# Patient Record
Sex: FEMALE | Race: WHITE | NOT HISPANIC OR LATINO | URBAN - METROPOLITAN AREA
[De-identification: names, ages, dates, MRNs, and addresses within clinical notes are randomized per-mention and may not be internally consistent; named-entity substitution may affect disease eponyms.]

---

## 2020-06-26 ENCOUNTER — WEB ENCOUNTER (OUTPATIENT)
Dept: URBAN - METROPOLITAN AREA CLINIC 19 | Facility: CLINIC | Age: 65
End: 2020-06-26

## 2020-06-30 ENCOUNTER — OFFICE VISIT (OUTPATIENT)
Dept: URBAN - METROPOLITAN AREA CLINIC 19 | Facility: CLINIC | Age: 65
End: 2020-06-30
Payer: MEDICARE

## 2020-06-30 DIAGNOSIS — K52.832 LYMPHOCYTIC COLITIS: ICD-10-CM

## 2020-06-30 DIAGNOSIS — K75.81 NASH (NONALCOHOLIC STEATOHEPATITIS): ICD-10-CM

## 2020-06-30 PROCEDURE — G8417 CALC BMI ABV UP PARAM F/U: HCPCS | Performed by: INTERNAL MEDICINE

## 2020-06-30 PROCEDURE — G9903 PT SCRN TBCO ID AS NON USER: HCPCS | Performed by: INTERNAL MEDICINE

## 2020-06-30 PROCEDURE — 3017F COLORECTAL CA SCREEN DOC REV: CPT | Performed by: INTERNAL MEDICINE

## 2020-06-30 PROCEDURE — G8427 DOCREV CUR MEDS BY ELIG CLIN: HCPCS | Performed by: INTERNAL MEDICINE

## 2020-06-30 PROCEDURE — 99214 OFFICE O/P EST MOD 30 MIN: CPT | Performed by: INTERNAL MEDICINE

## 2020-06-30 RX ORDER — ATORVASTATIN CALCIUM 40 MG/1
TABLET, FILM COATED ORAL
Qty: 0 | Refills: 0 | Status: ACTIVE | COMMUNITY
Start: 1900-01-01

## 2020-06-30 RX ORDER — METOPROLOL SUCCINATE 100 MG/1
TAKE 1 TABLET (100 MG) BY ORAL ROUTE ONCE DAILY TABLET, EXTENDED RELEASE ORAL 1
Qty: 0 | Refills: 0 | Status: ACTIVE | COMMUNITY
Start: 1900-01-01

## 2020-06-30 RX ORDER — DAPAGLIFLOZIN 10 MG/1
1 TABLET TABLET, FILM COATED ORAL ONCE A DAY
Status: ACTIVE | COMMUNITY

## 2020-06-30 RX ORDER — ASPIRIN 81 MG/1
1 TABLET TABLET, COATED ORAL ONCE A DAY
Status: ACTIVE | COMMUNITY

## 2020-06-30 RX ORDER — VALSARTAN AND HYDROCHLOROTHIAZIDE 160; 25 MG/1; MG/1
TAKE 1 TABLET BY ORAL ROUTE ONCE DAILY TABLET, FILM COATED ORAL 1
Qty: 0 | Refills: 0 | Status: ACTIVE | COMMUNITY
Start: 1900-01-01

## 2020-06-30 RX ORDER — AMITRIPTYLINE HYDROCHLORIDE 25 MG/1
TABLET, FILM COATED ORAL
Qty: 0 | Refills: 0 | Status: DISCONTINUED | COMMUNITY
Start: 1900-01-01

## 2020-06-30 NOTE — HPI-TODAY'S VISIT:
The patient is a 64 year old /White female, who presents on referral from Susan Viera MD, for a colonoscopy. A copy of this document will be sent to the referring provider. The patient had a previous colonoscopy on 06/21/2016. It revealed a polyp in an unknown location.. The patient does not have any risk factors for colon cancer, but is over the recommended age for screening. There is no recent history of rectal bleeding. The patient has no pertinent additional complaints of constipation and weight loss.  7/1/19: Patient was evaluated previously with EGD/colonoscopy in 2016 for a variety of symptoms: dyspepsia, diarrhea, and lower abdominal pain. She states that she was diagnosed with colon polyps and microscopic colitis - not currently on any medications for microscopic colitis. She does take glutamine for "colon health". She also has chronic dyspepsia/bloating/GERD and NAFLD. Obese with obstructive sleep apnea and fibromyalgia. Diabetic but gastric emptying study was negative. She also states that she has vitamin B12 deficiency requiring intermittent injections of vitamin B12. Patient states that a high-fiber diet has helped with her diarrhea. 9/20/19: I performed an EGD/colonoscopy on 8/23/19, which confirmed the diagnosis of microscopic colitis. No other abnormalities were noted except mild reflux esophagitis (no Webster's esophagus), mild H. pylori-negative gastritis, sigmoid diverticular disease, and internal hemorrhoids. Symptoms appear to be mild, but I offered budesonide for treatment. Will continue to monitor symptoms as well as HASSAN.   2/26/20: Patient returns for reevaluation of her microscopic colitis. She did well on budesonide, but her stools turned to mush soon afterwards. She thinks that metformin may be causing her diarrhea along with possibly her other medications. Will discuss with her PCP.  6/30/20: Patient returns for reevaluation of her HASSAN and microscopic colitis. In regards to her microscopic colitis, I believe that her symptoms previously were related to her metformin. Since discontinuing that drug and replacing it with Farxiga, she has decreased her BMs to 2-3 formed stools/day with decreased cramping. Still has some gas. No signs of chronic liver disease - I still emphasized healthy eating and exercise with the goal of losing weight to decrease the risk of cirrhosis.

## 2020-07-01 LAB
A/G RATIO: 1.5
ALBUMIN: 4.7
ALKALINE PHOSPHATASE: 87
ALT (SGPT): 33
AST (SGOT): 33
BILIRUBIN, TOTAL: 0.4
BUN/CREATININE RATIO: 15
BUN: 14
CALCIUM: 10.1
CARBON DIOXIDE, TOTAL: 24
CHLORIDE: 99
CREATININE: 0.94
EGFR IF AFRICN AM: 75
EGFR IF NONAFRICN AM: 65
GLOBULIN, TOTAL: 3.1
GLUCOSE: 118
HEMATOCRIT: 43
HEMOGLOBIN: 13.6
MCH: 27.7
MCHC: 31.6
MCV: 88
NRBC: (no result)
PLATELETS: 328
POTASSIUM: 3.5
PROTEIN, TOTAL: 7.8
RBC: 4.91
RDW: 15
SODIUM: 142
WBC: 5.6

## 2020-12-29 ENCOUNTER — OFFICE VISIT (OUTPATIENT)
Dept: URBAN - METROPOLITAN AREA CLINIC 19 | Facility: CLINIC | Age: 65
End: 2020-12-29

## 2020-12-29 ENCOUNTER — WEB ENCOUNTER (OUTPATIENT)
Dept: URBAN - METROPOLITAN AREA CLINIC 19 | Facility: CLINIC | Age: 65
End: 2020-12-29

## 2020-12-29 ENCOUNTER — OFFICE VISIT (OUTPATIENT)
Dept: URBAN - METROPOLITAN AREA CLINIC 19 | Facility: CLINIC | Age: 65
End: 2020-12-29
Payer: MEDICARE

## 2020-12-29 DIAGNOSIS — K52.832 LYMPHOCYTIC COLITIS: ICD-10-CM

## 2020-12-29 DIAGNOSIS — K75.81 NASH (NONALCOHOLIC STEATOHEPATITIS): ICD-10-CM

## 2020-12-29 PROCEDURE — 99213 OFFICE O/P EST LOW 20 MIN: CPT | Performed by: INTERNAL MEDICINE

## 2020-12-29 PROCEDURE — G9903 PT SCRN TBCO ID AS NON USER: HCPCS | Performed by: INTERNAL MEDICINE

## 2020-12-29 PROCEDURE — G8482 FLU IMMUNIZE ORDER/ADMIN: HCPCS | Performed by: INTERNAL MEDICINE

## 2020-12-29 PROCEDURE — 3017F COLORECTAL CA SCREEN DOC REV: CPT | Performed by: INTERNAL MEDICINE

## 2020-12-29 PROCEDURE — G8417 CALC BMI ABV UP PARAM F/U: HCPCS | Performed by: INTERNAL MEDICINE

## 2020-12-29 PROCEDURE — G8427 DOCREV CUR MEDS BY ELIG CLIN: HCPCS | Performed by: INTERNAL MEDICINE

## 2020-12-29 RX ORDER — VALSARTAN AND HYDROCHLOROTHIAZIDE 160; 25 MG/1; MG/1
TAKE 1 TABLET BY ORAL ROUTE ONCE DAILY TABLET, FILM COATED ORAL 1
Qty: 0 | Refills: 0 | COMMUNITY
Start: 1900-01-01

## 2020-12-29 RX ORDER — DAPAGLIFLOZIN 10 MG/1
1 TABLET TABLET, FILM COATED ORAL ONCE A DAY
COMMUNITY

## 2020-12-29 RX ORDER — ATORVASTATIN CALCIUM 40 MG/1
TABLET, FILM COATED ORAL
Qty: 0 | Refills: 0 | COMMUNITY
Start: 1900-01-01

## 2020-12-29 RX ORDER — ASPIRIN 81 MG/1
1 TABLET TABLET, COATED ORAL ONCE A DAY
COMMUNITY

## 2020-12-29 RX ORDER — METOPROLOL SUCCINATE 100 MG/1
TAKE 1 TABLET (100 MG) BY ORAL ROUTE ONCE DAILY TABLET, EXTENDED RELEASE ORAL 1
Qty: 0 | Refills: 0 | COMMUNITY
Start: 1900-01-01

## 2020-12-30 LAB
A/G RATIO: 1.3
ALBUMIN: 4.2
ALKALINE PHOSPHATASE: 89
ALT (SGPT): 28
AST (SGOT): 23
BILIRUBIN, TOTAL: 0.4
BUN/CREATININE RATIO: 20
BUN: 19
CALCIUM: 9.6
CARBON DIOXIDE, TOTAL: 23
CHLORIDE: 100
CREATININE: 0.94
EGFR IF AFRICN AM: 75
EGFR IF NONAFRICN AM: 65
GLOBULIN, TOTAL: 3.3
GLUCOSE: 102
HEMATOCRIT: 42.7
HEMOGLOBIN: 13.7
MCH: 29.3
MCHC: 32.1
MCV: 91
NRBC: (no result)
PLATELETS: 332
POTASSIUM: 3.5
PROTEIN, TOTAL: 7.5
RBC: 4.67
RDW: 13.3
SODIUM: 140
WBC: 5.5

## 2021-06-29 ENCOUNTER — OFFICE VISIT (OUTPATIENT)
Dept: URBAN - METROPOLITAN AREA CLINIC 19 | Facility: CLINIC | Age: 66
End: 2021-06-29

## 2021-07-16 ENCOUNTER — TELEPHONE ENCOUNTER (OUTPATIENT)
Dept: URBAN - METROPOLITAN AREA CLINIC 19 | Facility: CLINIC | Age: 66
End: 2021-07-16

## 2021-07-16 ENCOUNTER — OFFICE VISIT (OUTPATIENT)
Dept: URBAN - METROPOLITAN AREA CLINIC 19 | Facility: CLINIC | Age: 66
End: 2021-07-16
Payer: MEDICARE

## 2021-07-16 DIAGNOSIS — K75.81 NASH (NONALCOHOLIC STEATOHEPATITIS): ICD-10-CM

## 2021-07-16 DIAGNOSIS — Z79.899 METHOTREXATE, LONG TERM, CURRENT USE: ICD-10-CM

## 2021-07-16 PROCEDURE — 99213 OFFICE O/P EST LOW 20 MIN: CPT | Performed by: INTERNAL MEDICINE

## 2021-07-16 RX ORDER — ASPIRIN 81 MG/1
1 TABLET TABLET, COATED ORAL ONCE A DAY
COMMUNITY

## 2021-07-16 RX ORDER — DAPAGLIFLOZIN 10 MG/1
1 TABLET TABLET, FILM COATED ORAL ONCE A DAY
COMMUNITY

## 2021-07-16 RX ORDER — METOPROLOL SUCCINATE 100 MG/1
TAKE 1 TABLET (100 MG) BY ORAL ROUTE ONCE DAILY TABLET, EXTENDED RELEASE ORAL 1
Qty: 0 | Refills: 0 | COMMUNITY
Start: 1900-01-01

## 2021-07-16 RX ORDER — VALSARTAN AND HYDROCHLOROTHIAZIDE 160; 25 MG/1; MG/1
TAKE 1 TABLET BY ORAL ROUTE ONCE DAILY TABLET, FILM COATED ORAL 1
Qty: 0 | Refills: 0 | COMMUNITY
Start: 1900-01-01

## 2021-07-16 RX ORDER — ATORVASTATIN CALCIUM 40 MG/1
TABLET, FILM COATED ORAL
Qty: 0 | Refills: 0 | COMMUNITY
Start: 1900-01-01

## 2021-07-16 NOTE — HPI-TODAY'S VISIT:
The patient is a 64 year old /White female, who presents on referral from Susan Viera MD, for a colonoscopy. A copy of this document will be sent to the referring provider. The patient had a previous colonoscopy on 06/21/2016. It revealed a polyp in an unknown location.. The patient does not have any risk factors for colon cancer, but is over the recommended age for screening. There is no recent history of rectal bleeding. The patient has no pertinent additional complaints of constipation and weight loss.  7/1/19: Patient was evaluated previously with EGD/colonoscopy in 2016 for a variety of symptoms: dyspepsia, diarrhea, and lower abdominal pain. She states that she was diagnosed with colon polyps and microscopic colitis - not currently on any medications for microscopic colitis. She does take glutamine for "colon health". She also has chronic dyspepsia/bloating/GERD and NAFLD. Obese with obstructive sleep apnea and fibromyalgia. Diabetic but gastric emptying study was negative. She also states that she has vitamin B12 deficiency requiring intermittent injections of vitamin B12. Patient states that a high-fiber diet has helped with her diarrhea.   9/20/19: I performed an EGD/colonoscopy on 8/23/19, which confirmed the diagnosis of microscopic colitis. No other abnormalities were noted except mild reflux esophagitis (no Webster's esophagus), mild H. pylori-negative gastritis, sigmoid diverticular disease, and internal hemorrhoids. Symptoms appear to be mild, but I offered budesonide for treatment. Will continue to monitor symptoms as well as HASSAN.   2/26/20: Patient returns for reevaluation of her microscopic colitis. She did well on budesonide, but her stools turned to mush soon afterwards. She thinks that metformin may be causing her diarrhea along with possibly her other medications. Will discuss with her PCP.  6/30/20: Patient returns for reevaluation of her HASSAN and microscopic colitis. In regards to her microscopic colitis, I believe that her symptoms previously were related to her metformin. Since discontinuing that drug and replacing it with Farxiga, she has decreased her BMs to 2-3 formed stools/day with decreased cramping. Still has some gas. No signs of chronic liver disease - I still emphasized healthy eating and exercise with the goal of losing weight to decrease the risk of cirrhosis.  7/16/21:  Patient presents for evaluation of her liver disease.  She is scheduled to get labs drawn through her rheumatologist after this clinic visit, but she was recently placed on methotrexate.  She has no signs of liver disease or symptoms at this time.  No diarrhea - her bowels are fine.

## 2022-01-07 ENCOUNTER — OFFICE VISIT (OUTPATIENT)
Dept: URBAN - METROPOLITAN AREA CLINIC 19 | Facility: CLINIC | Age: 67
End: 2022-01-07

## 2022-01-14 ENCOUNTER — OFFICE VISIT (OUTPATIENT)
Dept: URBAN - METROPOLITAN AREA CLINIC 19 | Facility: CLINIC | Age: 67
End: 2022-01-14

## 2022-02-10 ENCOUNTER — WEB ENCOUNTER (OUTPATIENT)
Dept: URBAN - METROPOLITAN AREA CLINIC 19 | Facility: CLINIC | Age: 67
End: 2022-02-10

## 2022-02-14 ENCOUNTER — OFFICE VISIT (OUTPATIENT)
Dept: URBAN - METROPOLITAN AREA CLINIC 19 | Facility: CLINIC | Age: 67
End: 2022-02-14
Payer: MEDICARE

## 2022-02-14 ENCOUNTER — TELEPHONE ENCOUNTER (OUTPATIENT)
Dept: URBAN - METROPOLITAN AREA CLINIC 19 | Facility: CLINIC | Age: 67
End: 2022-02-14

## 2022-02-14 DIAGNOSIS — Z86.010 HISTORY OF COLON POLYPS: ICD-10-CM

## 2022-02-14 DIAGNOSIS — K52.839 MICROSCOPIC COLITIS: ICD-10-CM

## 2022-02-14 DIAGNOSIS — K75.81 NASH (NONALCOHOLIC STEATOHEPATITIS): ICD-10-CM

## 2022-02-14 PROCEDURE — 99213 OFFICE O/P EST LOW 20 MIN: CPT | Performed by: INTERNAL MEDICINE

## 2022-02-14 RX ORDER — ATORVASTATIN CALCIUM 40 MG/1
TABLET, FILM COATED ORAL
Qty: 0 | Refills: 0 | Status: ACTIVE | COMMUNITY
Start: 1900-01-01

## 2022-02-14 RX ORDER — DAPAGLIFLOZIN 5 MG/1
1 TABLET TABLET, FILM COATED ORAL ONCE A DAY
Status: ACTIVE | COMMUNITY

## 2022-02-14 RX ORDER — METOPROLOL SUCCINATE 100 MG/1
TAKE 1 TABLET (100 MG) BY ORAL ROUTE ONCE DAILY TABLET, EXTENDED RELEASE ORAL 1
Qty: 0 | Refills: 0 | Status: ACTIVE | COMMUNITY
Start: 1900-01-01

## 2022-02-14 RX ORDER — ASPIRIN 81 MG/1
1 TABLET TABLET, COATED ORAL ONCE A DAY
Status: ACTIVE | COMMUNITY

## 2022-02-14 RX ORDER — VALSARTAN AND HYDROCHLOROTHIAZIDE 160; 25 MG/1; MG/1
TAKE 1 TABLET BY ORAL ROUTE ONCE DAILY TABLET, FILM COATED ORAL 1
Qty: 0 | Refills: 0 | Status: ACTIVE | COMMUNITY
Start: 1900-01-01

## 2022-02-14 RX ORDER — FOLIC ACID 1 MG/1
1 TABLET TABLET ORAL ONCE A DAY
Status: ACTIVE | COMMUNITY

## 2022-02-14 NOTE — HPI-TODAY'S VISIT:
The patient is a 64 year old /White female, who presents on referral from Susan Viera MD, for a colonoscopy. A copy of this document will be sent to the referring provider. The patient had a previous colonoscopy on 06/21/2016. It revealed a polyp in an unknown location.. The patient does not have any risk factors for colon cancer, but is over the recommended age for screening. There is no recent history of rectal bleeding. The patient has no pertinent additional complaints of constipation and weight loss.  7/1/19: Patient was evaluated previously with EGD/colonoscopy in 2016 for a variety of symptoms: dyspepsia, diarrhea, and lower abdominal pain. She states that she was diagnosed with colon polyps and microscopic colitis - not currently on any medications for microscopic colitis. She does take glutamine for "colon health". She also has chronic dyspepsia/bloating/GERD and NAFLD. Obese with obstructive sleep apnea and fibromyalgia. Diabetic but gastric emptying study was negative. She also states that she has vitamin B12 deficiency requiring intermittent injections of vitamin B12. Patient states that a high-fiber diet has helped with her diarrhea.   9/20/19: I performed an EGD/colonoscopy on 8/23/19, which confirmed the diagnosis of microscopic colitis. No other abnormalities were noted except mild reflux esophagitis (no Webster's esophagus), mild H. pylori-negative gastritis, sigmoid diverticular disease, and internal hemorrhoids. Symptoms appear to be mild, but I offered budesonide for treatment. Will continue to monitor symptoms as well as HASSAN.   2/26/20: Patient returns for reevaluation of her microscopic colitis. She did well on budesonide, but her stools turned to mush soon afterwards. She thinks that metformin may be causing her diarrhea along with possibly her other medications. Will discuss with her PCP.  6/30/20: Patient returns for reevaluation of her HASSAN and microscopic colitis. In regards to her microscopic colitis, I believe that her symptoms previously were related to her metformin. Since discontinuing that drug and replacing it with Farxiga, she has decreased her BMs to 2-3 formed stools/day with decreased cramping. Still has some gas. No signs of chronic liver disease - I still emphasized healthy eating and exercise with the goal of losing weight to decrease the risk of cirrhosis.  7/16/21:  Patient presents for evaluation of her liver disease.  She is scheduled to get labs drawn through her rheumatologist after this clinic visit, but she was recently placed on methotrexate.  She has no signs of liver disease or symptoms at this time.  No diarrhea - her bowels are fine.  2/14/22:  Patient presents for reevaluation of her HASSAN and microscopic colitis.  In regards to her HASSAN, her last labs (7/19/21) showed: AST 33, ALT 35.  She shows no signs of chronic liver disease.  She admits that she has not eaten healthy or exercised much since she last saw me; as a result, she has gained some weight.  Labs were recently drawn through her rheumatologist, Dr. Con Roberson, on 2/3/22 and will be reviewed.  She has two BMs/day that are formed - no issues with her colitis.  She really believes that the metformin was causing her prior diarrhea.  Her diabetes is currently controlled with a hemoglobin A1c less than 6 percent.

## 2023-02-13 ENCOUNTER — OFFICE VISIT (OUTPATIENT)
Dept: URBAN - METROPOLITAN AREA CLINIC 19 | Facility: CLINIC | Age: 68
End: 2023-02-13
Payer: MEDICARE

## 2023-02-13 VITALS
BODY MASS INDEX: 36.57 KG/M2 | SYSTOLIC BLOOD PRESSURE: 110 MMHG | HEIGHT: 67 IN | WEIGHT: 233 LBS | DIASTOLIC BLOOD PRESSURE: 62 MMHG | TEMPERATURE: 97 F

## 2023-02-13 DIAGNOSIS — Z79.899 METHOTREXATE, LONG TERM, CURRENT USE: ICD-10-CM

## 2023-02-13 DIAGNOSIS — K75.81 NASH (NONALCOHOLIC STEATOHEPATITIS): ICD-10-CM

## 2023-02-13 DIAGNOSIS — E53.8 VITAMIN B12 DEFICIENCY: ICD-10-CM

## 2023-02-13 DIAGNOSIS — K52.832 LYMPHOCYTIC COLITIS: ICD-10-CM

## 2023-02-13 DIAGNOSIS — Z86.010 HISTORY OF COLON POLYPS: ICD-10-CM

## 2023-02-13 PROBLEM — 710814002 LONG-TERM CURRENT USE OF DRUG THERAPY: Status: ACTIVE | Noted: 2021-07-16

## 2023-02-13 PROCEDURE — 99213 OFFICE O/P EST LOW 20 MIN: CPT | Performed by: INTERNAL MEDICINE

## 2023-02-13 RX ORDER — FOLIC ACID 1 MG/1
1 TABLET TABLET ORAL ONCE A DAY
Status: ACTIVE | COMMUNITY

## 2023-02-13 RX ORDER — VALSARTAN AND HYDROCHLOROTHIAZIDE 160; 25 MG/1; MG/1
TAKE 1 TABLET BY ORAL ROUTE ONCE DAILY TABLET, FILM COATED ORAL 1
Qty: 0 | Refills: 0 | Status: ACTIVE | COMMUNITY
Start: 1900-01-01

## 2023-02-13 RX ORDER — ASPIRIN 81 MG/1
1 TABLET TABLET, COATED ORAL
Status: ACTIVE | COMMUNITY

## 2023-02-13 RX ORDER — DAPAGLIFLOZIN 5 MG/1
1 TABLET TABLET, FILM COATED ORAL ONCE A DAY
Status: ACTIVE | COMMUNITY

## 2023-02-13 RX ORDER — METOPROLOL SUCCINATE 100 MG/1
TAKE 1 TABLET (100 MG) BY ORAL ROUTE ONCE DAILY TABLET, EXTENDED RELEASE ORAL 1
Qty: 0 | Refills: 0 | Status: ACTIVE | COMMUNITY
Start: 1900-01-01

## 2023-02-13 RX ORDER — ATORVASTATIN CALCIUM 40 MG/1
TABLET, FILM COATED ORAL
Qty: 0 | Refills: 0 | Status: ACTIVE | COMMUNITY
Start: 1900-01-01

## 2023-02-13 NOTE — HPI-TODAY'S VISIT:
The patient is a 64 year old /White female, who presents on referral from Susan Viera MD, for a colonoscopy. A copy of this document will be sent to the referring provider. The patient had a previous colonoscopy on 06/21/2016. It revealed a polyp in an unknown location.. The patient does not have any risk factors for colon cancer, but is over the recommended age for screening. There is no recent history of rectal bleeding. The patient has no pertinent additional complaints of constipation and weight loss.  7/1/19: Patient was evaluated previously with EGD/colonoscopy in 2016 for a variety of symptoms: dyspepsia, diarrhea, and lower abdominal pain. She states that she was diagnosed with colon polyps and microscopic colitis - not currently on any medications for microscopic colitis. She does take glutamine for "colon health". She also has chronic dyspepsia/bloating/GERD and NAFLD. Obese with obstructive sleep apnea and fibromyalgia. Diabetic but gastric emptying study was negative. She also states that she has vitamin B12 deficiency requiring intermittent injections of vitamin B12. Patient states that a high-fiber diet has helped with her diarrhea.   9/20/19: I performed an EGD/colonoscopy on 8/23/19, which confirmed the diagnosis of microscopic colitis. No other abnormalities were noted except mild reflux esophagitis (no Webster's esophagus), mild H. pylori-negative gastritis, sigmoid diverticular disease, and internal hemorrhoids. Symptoms appear to be mild, but I offered budesonide for treatment. Will continue to monitor symptoms as well as HASSAN.   2/26/20: Patient returns for reevaluation of her microscopic colitis. She did well on budesonide, but her stools turned to mush soon afterwards. She thinks that metformin may be causing her diarrhea along with possibly her other medications. Will discuss with her PCP.  6/30/20: Patient returns for reevaluation of her HASSAN and microscopic colitis. In regards to her microscopic colitis, I believe that her symptoms previously were related to her metformin. Since discontinuing that drug and replacing it with Farxiga, she has decreased her BMs to 2-3 formed stools/day with decreased cramping. Still has some gas. No signs of chronic liver disease - I still emphasized healthy eating and exercise with the goal of losing weight to decrease the risk of cirrhosis.  7/16/21:  Patient presents for evaluation of her liver disease.  She is scheduled to get labs drawn through her rheumatologist after this clinic visit, but she was recently placed on methotrexate.  She has no signs of liver disease or symptoms at this time.  No diarrhea - her bowels are fine.  2/14/22:  Patient presents for reevaluation of her HASSAN and microscopic colitis.  In regards to her HASSAN, her last labs (7/19/21) showed: AST 33, ALT 35.  She shows no signs of chronic liver disease.  She admits that she has not eaten healthy or exercised much since she last saw me; as a result, she has gained some weight.  Labs were recently drawn through her rheumatologist, Dr. Con Roberson, on 2/3/22 and will be reviewed.  She has two BMs/day that are formed - no issues with her colitis.  She really believes that the metformin was causing her prior diarrhea.  Her diabetes is currently controlled with a hemoglobin A1c less than 6 percent.  2/13/23: Patient presents for follow-up of her HASSAN/NAFLD and microscopic colitis. Doing well. Her last labs from Dr. Con Roberson's office (2/3/23) showed normal LFTs (AST 24/ALT 25). She still takes methotrexate, although Dr. Roberson is reportedly thinking of discontinuing the drug since her symptoms are well controlled. She is not having any diarrheal issues.

## 2023-03-15 ENCOUNTER — OFFICE VISIT (OUTPATIENT)
Dept: URBAN - METROPOLITAN AREA CLINIC 18 | Facility: CLINIC | Age: 68
End: 2023-03-15
Payer: MEDICARE

## 2023-03-15 DIAGNOSIS — K76.0 FATTY (CHANGE OF) LIVER: ICD-10-CM

## 2023-03-15 PROCEDURE — 76705 ECHO EXAM OF ABDOMEN: CPT | Performed by: INTERNAL MEDICINE

## 2024-02-20 ENCOUNTER — OV EP (OUTPATIENT)
Dept: URBAN - METROPOLITAN AREA CLINIC 19 | Facility: CLINIC | Age: 69
End: 2024-02-20
Payer: MEDICARE

## 2024-02-20 ENCOUNTER — LAB (OUTPATIENT)
Dept: URBAN - METROPOLITAN AREA CLINIC 19 | Facility: CLINIC | Age: 69
End: 2024-02-20

## 2024-02-20 VITALS
HEIGHT: 67 IN | BODY MASS INDEX: 36.82 KG/M2 | WEIGHT: 234.6 LBS | SYSTOLIC BLOOD PRESSURE: 130 MMHG | TEMPERATURE: 97.2 F | DIASTOLIC BLOOD PRESSURE: 90 MMHG

## 2024-02-20 DIAGNOSIS — E11.9: ICD-10-CM

## 2024-02-20 DIAGNOSIS — E66.3 OVERWEIGHT: ICD-10-CM

## 2024-02-20 DIAGNOSIS — G47.00 INSOMNIA, UNSPECIFIED TYPE: ICD-10-CM

## 2024-02-20 DIAGNOSIS — K75.81 METABOLIC DYSFUNCTION-ASSOCIATED STEATOHEPATITIS (MASH): ICD-10-CM

## 2024-02-20 DIAGNOSIS — Z86.010 HISTORY OF COLON POLYPS: ICD-10-CM

## 2024-02-20 PROBLEM — 193462001: Status: ACTIVE | Noted: 2024-02-20

## 2024-02-20 PROCEDURE — 99213 OFFICE O/P EST LOW 20 MIN: CPT | Performed by: INTERNAL MEDICINE

## 2024-02-20 RX ORDER — SODIUM, POTASSIUM,MAG SULFATES 17.5-3.13G
TAKE 254 ML SOLUTION, RECONSTITUTED, ORAL ORAL AS DIRECTED
Qty: 1 KIT | Refills: 0 | OUTPATIENT
Start: 2024-02-20 | End: 2024-02-21

## 2024-02-20 RX ORDER — METOPROLOL SUCCINATE 100 MG/1
TAKE 1 TABLET (100 MG) BY ORAL ROUTE ONCE DAILY TABLET, EXTENDED RELEASE ORAL 1
Qty: 0 | Refills: 0 | Status: ACTIVE | COMMUNITY
Start: 1900-01-01

## 2024-02-20 RX ORDER — FOLIC ACID 1 MG/1
1 TABLET TABLET ORAL ONCE A DAY
Status: ACTIVE | COMMUNITY

## 2024-02-20 RX ORDER — DAPAGLIFLOZIN 5 MG/1
1 TABLET TABLET, FILM COATED ORAL ONCE A DAY
Status: ACTIVE | COMMUNITY

## 2024-02-20 RX ORDER — ASPIRIN 81 MG/1
1 TABLET TABLET, COATED ORAL
Status: ACTIVE | COMMUNITY

## 2024-02-20 RX ORDER — VALSARTAN AND HYDROCHLOROTHIAZIDE 160; 25 MG/1; MG/1
TAKE 1 TABLET BY ORAL ROUTE ONCE DAILY TABLET, FILM COATED ORAL 1
Qty: 0 | Refills: 0 | Status: ACTIVE | COMMUNITY
Start: 1900-01-01

## 2024-02-20 RX ORDER — ATORVASTATIN CALCIUM 40 MG/1
TABLET, FILM COATED ORAL
Qty: 0 | Refills: 0 | Status: ACTIVE | COMMUNITY
Start: 1900-01-01

## 2024-02-20 NOTE — HPI-TODAY'S VISIT:
The patient is a 64 year old /White female, who presents on referral from Susan Viera MD, for a colonoscopy. A copy of this document will be sent to the referring provider. The patient had a previous colonoscopy on 06/21/2016. It revealed a polyp in an unknown location.. The patient does not have any risk factors for colon cancer, but is over the recommended age for screening. There is no recent history of rectal bleeding. The patient has no pertinent additional complaints of constipation and weight loss.  7/1/19: Patient was evaluated previously with EGD/colonoscopy in 2016 for a variety of symptoms: dyspepsia, diarrhea, and lower abdominal pain. She states that she was diagnosed with colon polyps and microscopic colitis - not currently on any medications for microscopic colitis. She does take glutamine for "colon health". She also has chronic dyspepsia/bloating/GERD and NAFLD. Obese with obstructive sleep apnea and fibromyalgia. Diabetic but gastric emptying study was negative. She also states that she has vitamin B12 deficiency requiring intermittent injections of vitamin B12. Patient states that a high-fiber diet has helped with her diarrhea.   9/20/19: I performed an EGD/colonoscopy on 8/23/19, which confirmed the diagnosis of microscopic colitis. No other abnormalities were noted except mild reflux esophagitis (no Webster's esophagus), mild H. pylori-negative gastritis, sigmoid diverticular disease, and internal hemorrhoids. Symptoms appear to be mild, but I offered budesonide for treatment. Will continue to monitor symptoms as well as HASSAN.   2/26/20: Patient returns for reevaluation of her microscopic colitis. She did well on budesonide, but her stools turned to mush soon afterwards. She thinks that metformin may be causing her diarrhea along with possibly her other medications. Will discuss with her PCP.  6/30/20: Patient returns for reevaluation of her HASSAN and microscopic colitis. In regards to her microscopic colitis, I believe that her symptoms previously were related to her metformin. Since discontinuing that drug and replacing it with Farxiga, she has decreased her BMs to 2-3 formed stools/day with decreased cramping. Still has some gas. No signs of chronic liver disease - I still emphasized healthy eating and exercise with the goal of losing weight to decrease the risk of cirrhosis.  7/16/21:  Patient presents for evaluation of her liver disease.  She is scheduled to get labs drawn through her rheumatologist after this clinic visit, but she was recently placed on methotrexate.  She has no signs of liver disease or symptoms at this time.  No diarrhea - her bowels are fine.  2/14/22:  Patient presents for reevaluation of her HASSAN and microscopic colitis.  In regards to her HASSAN, her last labs (7/19/21) showed: AST 33, ALT 35.  She shows no signs of chronic liver disease.  She admits that she has not eaten healthy or exercised much since she last saw me; as a result, she has gained some weight.  Labs were recently drawn through her rheumatologist, Dr. Con Roberson, on 2/3/22 and will be reviewed.  She has two BMs/day that are formed - no issues with her colitis.  She really believes that the metformin was causing her prior diarrhea.  Her diabetes is currently controlled with a hemoglobin A1c less than 6 percent.  2/13/23: Patient presents for follow-up of her HASSAN/NAFLD and microscopic colitis. Doing well. Her last labs from Dr. Con Roberson's office (2/3/23) showed normal LFTs (AST 24/ALT 25). She still takes methotrexate, although Dr. Roberson is reportedly thinking of discontinuing the drug since her symptoms are well controlled. She is not having any diarrheal issues.  2/20/24: Patient presents for reevaluation of her MASH without cirrhosis. Her LFTs were within normal limits last year, although her ultrasound still showed steatosis. She is trying to lose weight with Ozempic, but she has been unhappy with the results. Has some reflux, somewhat responsive to ranitidine. No diarrhea. Complains of insomnia.

## 2024-02-21 LAB
A/G RATIO: 1.5
ALBUMIN: 4.3
ALKALINE PHOSPHATASE: 74
ALT (SGPT): 39
AST (SGOT): 33
BILIRUBIN, TOTAL: 0.4
BUN/CREATININE RATIO: (no result)
BUN: 11
CALCIUM: 9.6
CARBON DIOXIDE, TOTAL: 29
CHLORIDE: 101
CREATININE: 0.89
EGFR: 71
GLOBULIN, TOTAL: 2.8
GLUCOSE: 103
HEMATOCRIT: 44.1
HEMOGLOBIN: 14.5
MCH: 30.5
MCHC: 32.9
MCV: 92.8
MPV: 9.2
PLATELET COUNT: 319
POTASSIUM: 3.9
PROTEIN, TOTAL: 7.1
RDW: 14.2
RED BLOOD CELL COUNT: 4.75
SODIUM: 138
WHITE BLOOD CELL COUNT: 4.5

## 2024-03-21 ENCOUNTER — COLON (OUTPATIENT)
Dept: URBAN - METROPOLITAN AREA SURGERY CENTER 31 | Facility: SURGERY CENTER | Age: 69
End: 2024-03-21
Payer: MEDICARE

## 2024-03-21 ENCOUNTER — LAB (OUTPATIENT)
Dept: URBAN - METROPOLITAN AREA CLINIC 4 | Facility: CLINIC | Age: 69
End: 2024-03-21
Payer: MEDICARE

## 2024-03-21 DIAGNOSIS — D12.3 ADENOMA OF TRANSVERSE COLON: ICD-10-CM

## 2024-03-21 DIAGNOSIS — Z86.010 ADENOMAS PERSONAL HISTORY OF COLONIC POLYPS: ICD-10-CM

## 2024-03-21 DIAGNOSIS — D12.3 BENIGN NEOPLASM OF TRANSVERSE COLON: ICD-10-CM

## 2024-03-21 PROCEDURE — 45385 COLONOSCOPY W/LESION REMOVAL: CPT | Performed by: INTERNAL MEDICINE

## 2024-03-21 PROCEDURE — 88305 TISSUE EXAM BY PATHOLOGIST: CPT | Performed by: PATHOLOGY

## 2024-03-21 PROCEDURE — G8907 PT DOC NO EVENTS ON DISCHARG: HCPCS | Performed by: INTERNAL MEDICINE

## 2024-03-21 RX ORDER — ASPIRIN 81 MG/1
1 TABLET TABLET, COATED ORAL
Status: ACTIVE | COMMUNITY

## 2024-03-21 RX ORDER — DAPAGLIFLOZIN 5 MG/1
1 TABLET TABLET, FILM COATED ORAL ONCE A DAY
Status: ACTIVE | COMMUNITY

## 2024-03-21 RX ORDER — METOPROLOL SUCCINATE 100 MG/1
TAKE 1 TABLET (100 MG) BY ORAL ROUTE ONCE DAILY TABLET, EXTENDED RELEASE ORAL 1
Qty: 0 | Refills: 0 | Status: ACTIVE | COMMUNITY
Start: 1900-01-01

## 2024-03-21 RX ORDER — ATORVASTATIN CALCIUM 40 MG/1
TABLET, FILM COATED ORAL
Qty: 0 | Refills: 0 | Status: ACTIVE | COMMUNITY
Start: 1900-01-01

## 2024-03-21 RX ORDER — FOLIC ACID 1 MG/1
1 TABLET TABLET ORAL ONCE A DAY
Status: ACTIVE | COMMUNITY

## 2024-03-21 RX ORDER — VALSARTAN AND HYDROCHLOROTHIAZIDE 160; 25 MG/1; MG/1
TAKE 1 TABLET BY ORAL ROUTE ONCE DAILY TABLET, FILM COATED ORAL 1
Qty: 0 | Refills: 0 | Status: ACTIVE | COMMUNITY
Start: 1900-01-01
